# Patient Record
Sex: MALE | Race: WHITE | Employment: FULL TIME | URBAN - METROPOLITAN AREA
[De-identification: names, ages, dates, MRNs, and addresses within clinical notes are randomized per-mention and may not be internally consistent; named-entity substitution may affect disease eponyms.]

---

## 2017-12-28 ENCOUNTER — HOSPITAL ENCOUNTER (OUTPATIENT)
Age: 42
Discharge: HOME | End: 2017-12-28
Payer: COMMERCIAL

## 2017-12-28 LAB
EKG ATRIAL RATE: 69 BPM
EKG P AXIS: 52 DEGREES
EKG P-R INTERVAL: 174 MS
EKG Q-T INTERVAL: 392 MS
EKG QRS DURATION: 110 MS
EKG QTC CALCULATION (BAZETT): 420 MS
EKG R AXIS: 59 DEGREES
EKG T AXIS: 66 DEGREES
EKG VENTRICULAR RATE: 69 BPM

## 2017-12-28 PROCEDURE — 93005 ELECTROCARDIOGRAM TRACING: CPT

## 2019-04-16 ENCOUNTER — OFFICE VISIT (OUTPATIENT)
Dept: PODIATRY | Age: 44
End: 2019-04-16
Payer: COMMERCIAL

## 2019-04-16 VITALS
DIASTOLIC BLOOD PRESSURE: 91 MMHG | OXYGEN SATURATION: 95 % | SYSTOLIC BLOOD PRESSURE: 158 MMHG | HEART RATE: 72 BPM | HEIGHT: 74 IN | TEMPERATURE: 98.4 F | BODY MASS INDEX: 36.45 KG/M2 | WEIGHT: 284 LBS | RESPIRATION RATE: 16 BRPM

## 2019-04-16 DIAGNOSIS — M79.671 BILATERAL FOOT PAIN: ICD-10-CM

## 2019-04-16 DIAGNOSIS — L60.0 INGROWING NAIL: ICD-10-CM

## 2019-04-16 DIAGNOSIS — R26.2 TROUBLE WALKING: Primary | ICD-10-CM

## 2019-04-16 DIAGNOSIS — M79.672 BILATERAL FOOT PAIN: ICD-10-CM

## 2019-04-16 PROCEDURE — 11750 EXCISION NAIL&NAIL MATRIX: CPT | Performed by: PODIATRIST

## 2019-04-16 PROCEDURE — 99203 OFFICE O/P NEW LOW 30 MIN: CPT | Performed by: PODIATRIST

## 2019-04-16 NOTE — PROGRESS NOTES
[facility] PHYSICIANS  [facility] [address]  [address]  Dept: [phone]  Dept Fax: [phone]    NEW PATIENT PROGRESS NOTE  Date of patient's visit: 4/16/2019  Patient's Name:  Tracy Burnett YOB: 1975            Patient Care Team:  Aisha Grimaldo as PCP - General Laurie Grossman DPM as Physician (Podiatry)        Chief Complaint   Patient presents with    New Patient    Nail Problem         HPI:   Tracy Burnett is a 38 y.o. male who presents to the office today complaining of bilateral nail pain. Symptoms began 2 month(s) ago. Patient relates pain is Present. Pain is rated 3 out of 10 and is described as intermittent, mild. Treatments prior to today's visit include: none. Currently denies F/C/N/V. Pt's primary care physician is Aisha Grimaldo last seen 4/10/2019. No Known Allergies    No past medical history on file. Prior to Admission medications    Not on File       Past Surgical History:   Procedure Laterality Date    APPENDECTOMY      BACK SURGERY  2-2013    CHOLECYSTECTOMY      EXPLORATION OF UNDESCENDED TESTICLE      TONSILLECTOMY      VASECTOMY         No family history on file. Social History     Tobacco Use    Smoking status: Never Smoker    Smokeless tobacco: Current User     Types: Chew   Substance Use Topics    Alcohol use: Yes     Comment: 1/month       Review of Systems    Review of Systems:   History obtained from chart review and the patient  General ROS: negative for - chills, fatigue, fever, night sweats or weight gain  Constitutional: Negative for chills, diaphoresis, fatigue, fever and unexpected weight change. Musculoskeletal: Positive for arthralgias, gait problem and joint swelling. Neurological ROS: negative for - behavioral changes, confusion, headaches or seizures. Negative for weakness and numbness.    Dermatological ROS: negative for - mole changes, rash  Cardiovascular: Negative for leg swelling. Gastrointestinal: Negative for constipation, diarrhea, nausea and vomiting. Lower Extremity Physical Examination:   Vitals:   Vitals:    04/16/19 0813   BP: (!) 158/91   Pulse: 72   Resp: 16   Temp: 98.4 °F (36.9 °C)   SpO2: 95%     General: AAO x 3 in NAD. [facility] Dermatologic Exam:  Skin lesion/ulceration Absent . Skin No rashes or nodules noted. .       Musculoskeletal:     1st MPJ ROM decreased, Bilateral.  Muscle strength 5/5, Bilateral.  Pain present upon palpation of right and left great toe nail with thickening and incurvation. There is erythema to the nail border. Medial longitudinal arch, Bilateral WNL. Ankle ROM WNL,Bilateral.    Dorsally contracted digits absent digits 1-5 Bilateral.     Vascular: DP and PT pulses palpable 2/4, Bilateral.  CFT <3 seconds, Bilateral.  Hair growth present to the level of the digits, Bilateral.  Edema absent, Bilateral.  Varicosities absent, Bilateral. Erythema absent, Bilateral    Neurological: Sensation intact to light touch to level of digits, Bilateral.  Protective sensation intact 10/10 sites via 5.07/10g [place]-Gabriele Monofilament, Bilateral.  negative Tinel's, Bilateral.  negative Valleix sign, Bilateral.      Integument: Warm, dry, supple, Bilateral.  Open lesion absent, Bilateral.  Interdigital maceration absent to web spaces 1-4, Bilateral.    Fissures absent, Bilateral.     Asessment: Patient is a 38 y.o. male with:    Diagnosis Orders   1. Trouble walking  57934 - [place] REMOVAL OF NAIL BED   2. Bilateral foot pain  93190 - [place] REMOVAL OF NAIL BED   3. Ingrowing nail  36579 - [place] REMOVAL OF NAIL BED         Plan: Patient examined and evaluated. Current condition and treatment options discussed in detail. Discussed conservative and surgical options with the patient. Advised pt to his condition. Verbal consent obtained for chemical matrixectomy after all questions answered.   Local anesthesia obtained via Hallux block to the Bilateral hallux utilizing 5 cc of 1% Lidocaine plain. Next the Bilateral hallux was prepped with Betadine. A digital tourniquet was applied. A freer elevator was used to free the lateral nail border. An english anvil was used to remove the offending border in total.  A curette was used to ensure the offending border was free of any remaining nail. Next, three 30 second applications of 45% Phenol were applied to the offending nail border followed by an isopropyl alcohol flush. Triple antibiotic ointment was applied to the nail border followed by a semi-compressive dressing. The patient was instructed to leave this dressing clean/dry/intact until the following am at which point they will begin warm epsom salt soaks followed by application of antibiotic ointment and Band-Aid BID. Patient instructed to take Tylenol PRN pain. Post-operative chemical matrixectomy instruction sheet dispensed to patient. .  Verbal and written instructions given to patient. Contact office with any questions/problems/concerns. RTC in 2week(s).     4/16/2019    Electronically signed by Arabella Herron DPM on 4/16/2019 at 8:18 AM  4/16/2019

## 2019-04-30 ENCOUNTER — OFFICE VISIT (OUTPATIENT)
Dept: PODIATRY | Age: 44
End: 2019-04-30
Payer: COMMERCIAL

## 2019-04-30 VITALS — BODY MASS INDEX: 36.45 KG/M2 | WEIGHT: 284 LBS | RESPIRATION RATE: 16 BRPM | HEIGHT: 74 IN

## 2019-04-30 DIAGNOSIS — R26.2 TROUBLE WALKING: Primary | ICD-10-CM

## 2019-04-30 DIAGNOSIS — M79.672 BILATERAL FOOT PAIN: ICD-10-CM

## 2019-04-30 DIAGNOSIS — M79.671 BILATERAL FOOT PAIN: ICD-10-CM

## 2019-04-30 PROCEDURE — 99213 OFFICE O/P EST LOW 20 MIN: CPT | Performed by: PODIATRIST

## 2019-04-30 NOTE — PROGRESS NOTES
upon palpation of lateral borders of both great toes. Much improved pain. Medial longitudinal arch, Bilateral WNL. Ankle ROM WNL,Bilateral.    Dorsally contracted digits absent digits 1-5 Bilateral.     Vascular: DP and PT pulses palpable 2/4, Bilateral.  CFT <3 seconds, Bilateral.  Hair growth present to the level of the digits, Bilateral.  Edema absent, Bilateral.  Varicosities absent, Bilateral. Erythema absent, Bilateral    Neurological: Sensation intact to light touch to level of digits, Bilateral.  Protective sensation intact 10/10 sites via 5.07/10g [place]-Gabriele Monofilament, Bilateral.  negative Tinel's, Bilateral.  negative Valleix sign, Bilateral.      Integument: Warm, dry, supple, Bilateral.  Open lesion absent, Bilateral.  Interdigital maceration absent to web spaces 1-4, Bilateral.  Nails are normal in length, thickness and color 1-5 bilateral.  Fissures absent, Bilateral.       Asessment: Patient is a 38 y.o. male with:    Diagnosis Orders   1. Trouble walking     2. Bilateral foot pain         Plan: Patient examined and evaluated. Current condition and treatment options discussed in detail. Advised pt to keep soaking his feet daily but keeep bandaid off at night. .  Verbal and written instructions given to patient. Contact office with any questions/problems/concerns. No orders of the defined types were placed in this encounter. No orders of the defined types were placed in this encounter.        RTC in prn.    4/30/2019      Electronically signed by Leti Fernández DPM on 4/30/2019 at 9:47 AM  4/30/2019

## 2019-08-13 ENCOUNTER — OFFICE VISIT (OUTPATIENT)
Dept: PODIATRY | Age: 44
End: 2019-08-13
Payer: COMMERCIAL

## 2019-08-13 VITALS — BODY MASS INDEX: 35.29 KG/M2 | HEIGHT: 74 IN | RESPIRATION RATE: 16 BRPM | WEIGHT: 275 LBS

## 2019-08-13 DIAGNOSIS — M79.671 BILATERAL FOOT PAIN: ICD-10-CM

## 2019-08-13 DIAGNOSIS — M79.672 BILATERAL FOOT PAIN: ICD-10-CM

## 2019-08-13 DIAGNOSIS — R26.2 TROUBLE WALKING: Primary | ICD-10-CM

## 2019-08-13 DIAGNOSIS — L60.0 INGROWING NAIL: ICD-10-CM

## 2019-08-13 PROCEDURE — 11750 EXCISION NAIL&NAIL MATRIX: CPT | Performed by: PODIATRIST

## 2019-08-13 NOTE — PROGRESS NOTES
[facility] PHYSICIANS  [facility]  [address] [place] [address]  Dept: [phone]  Dept Fax: [phone]    RETURN PATIENT PROGRESS NOTE  Date of patient's visit: 8/13/2019  Patient's Name:  Mario Goldman YOB: 1975            Patient Care Team:  Anuel Delacruz as PCP - General  Nani Maddox DPM as Physician (Podiatry)       Mario Goldman 38 y.o. male that presents for follow-up of   Chief Complaint   Patient presents with    Nail Problem    Toe Pain     left great toe     Pt's primary care physician is Anuel Delacruz last seen 4/10/2019  Symptoms began 1 week(s) ago and are unchanged . Patient relates pain is Present. Pain is rated 4 out of 10 and is described as constant, moderate. Treatments prior to today's visit include: cutting them out hisself but it has been too painful. Currently denies F/C/N/V. No Known Allergies    No past medical history on file. Prior to Admission medications    Not on File       Review of Systems    Review of Systems:  History obtained from chart review and the patient  General ROS: negative for - chills, fatigue, fever, night sweats or weight gain  Constitutional: Negative for chills, diaphoresis, fatigue, fever and unexpected weight change. Musculoskeletal: Positive for arthralgias, gait problem and joint swelling. Neurological ROS: negative for - behavioral changes, confusion, headaches or seizures. Negative for weakness and numbness. Dermatological ROS: negative for - mole changes, rash  Cardiovascular: Negative for leg swelling. Gastrointestinal: Negative for constipation, diarrhea, nausea and vomiting. Lower Extremity Physical Examination:     Vitals:   Vitals:    08/13/19 1441   Resp: 16     General: AAO x 3 in NAD. Dermatologic Exam:  Skin lesion/ulceration Absent . Skin No rashes or nodules noted. .       Musculoskeletal:     1st MPJ ROM decreased, Bilateral.  Muscle strength

## 2020-02-05 ENCOUNTER — HOSPITAL ENCOUNTER (OUTPATIENT)
Dept: GENERAL RADIOLOGY | Age: 45
Discharge: HOME | End: 2020-02-05

## 2020-02-05 ENCOUNTER — HOSPITAL ENCOUNTER (OUTPATIENT)
Age: 45
Discharge: HOME | End: 2020-02-05

## 2020-02-05 DIAGNOSIS — Z00.00 PHYSICAL EXAM, ANNUAL: Primary | ICD-10-CM

## 2020-02-05 LAB
ALBUMIN SERPL-MCNC: 4.7 G/DL (ref 3.5–5.1)
ALP BLD-CCNC: 56 U/L (ref 38–126)
ALT SERPL-CCNC: 21 U/L (ref 11–66)
ANION GAP SERPL CALCULATED.3IONS-SCNC: 15 MEQ/L (ref 8–16)
AST SERPL-CCNC: 33 U/L (ref 5–40)
BACTERIA: ABNORMAL
BASOPHILS # BLD: 0.5 %
BASOPHILS ABSOLUTE: 0 THOU/MM3 (ref 0–0.1)
BILIRUB SERPL-MCNC: 0.8 MG/DL (ref 0.3–1.2)
BILIRUBIN URINE: NEGATIVE
BLOOD, URINE: ABNORMAL
BUN BLDV-MCNC: 10 MG/DL (ref 7–22)
CALCIUM SERPL-MCNC: 9.5 MG/DL (ref 8.5–10.5)
CASTS: ABNORMAL /LPF
CASTS: ABNORMAL /LPF
CHARACTER, URINE: CLEAR
CHLORIDE BLD-SCNC: 103 MEQ/L (ref 98–111)
CHOLESTEROL, TOTAL: 164 MG/DL (ref 100–199)
CO2: 23 MEQ/L (ref 23–33)
COLOR: YELLOW
CREAT SERPL-MCNC: 0.9 MG/DL (ref 0.4–1.2)
CRYSTALS: ABNORMAL
EKG ATRIAL RATE: 50 BPM
EKG P AXIS: 24 DEGREES
EKG P-R INTERVAL: 172 MS
EKG Q-T INTERVAL: 440 MS
EKG QRS DURATION: 114 MS
EKG QTC CALCULATION (BAZETT): 401 MS
EKG R AXIS: 21 DEGREES
EKG T AXIS: 49 DEGREES
EKG VENTRICULAR RATE: 50 BPM
EOSINOPHIL # BLD: 2.7 %
EOSINOPHILS ABSOLUTE: 0.2 THOU/MM3 (ref 0–0.4)
EPITHELIAL CELLS, UA: ABNORMAL /HPF
ERYTHROCYTE [DISTWIDTH] IN BLOOD BY AUTOMATED COUNT: 15.1 % (ref 11.5–14.5)
ERYTHROCYTE [DISTWIDTH] IN BLOOD BY AUTOMATED COUNT: 47.7 FL (ref 35–45)
GFR SERPL CREATININE-BSD FRML MDRD: > 90 ML/MIN/1.73M2
GLUCOSE BLD-MCNC: 76 MG/DL (ref 70–108)
GLUCOSE, URINE: NEGATIVE MG/DL
HCT VFR BLD CALC: 50.2 % (ref 42–52)
HDLC SERPL-MCNC: 40 MG/DL
HEMOGLOBIN: 16 GM/DL (ref 14–18)
IMMATURE GRANS (ABS): 0.02 THOU/MM3 (ref 0–0.07)
IMMATURE GRANULOCYTES: 0.3 %
KETONES, URINE: NEGATIVE
LDL CHOLESTEROL CALCULATED: 109 MG/DL
LEUKOCYTE ESTERASE, URINE: NEGATIVE
LYMPHOCYTES # BLD: 40.2 %
LYMPHOCYTES ABSOLUTE: 2.4 THOU/MM3 (ref 1–4.8)
MCH RBC QN AUTO: 28 PG (ref 26–33)
MCHC RBC AUTO-ENTMCNC: 31.9 GM/DL (ref 32.2–35.5)
MCV RBC AUTO: 87.8 FL (ref 80–94)
MISCELLANEOUS LAB TEST RESULT: ABNORMAL
MONOCYTES # BLD: 12.1 %
MONOCYTES ABSOLUTE: 0.7 THOU/MM3 (ref 0.4–1.3)
NITRITE, URINE: NEGATIVE
NUCLEATED RED BLOOD CELLS: 0 /100 WBC
PH UA: 6 (ref 5–9)
PLATELET # BLD: 379 THOU/MM3 (ref 130–400)
PMV BLD AUTO: 9.3 FL (ref 9.4–12.4)
POTASSIUM SERPL-SCNC: 4.1 MEQ/L (ref 3.5–5.2)
PROSTATE SPECIFIC ANTIGEN: 0.57 NG/ML (ref 0–1)
PROTEIN UA: NEGATIVE MG/DL
RBC # BLD: 5.72 MILL/MM3 (ref 4.7–6.1)
RBC URINE: ABNORMAL /HPF
RENAL EPITHELIAL, UA: ABNORMAL
SEG NEUTROPHILS: 44.2 %
SEGMENTED NEUTROPHILS ABSOLUTE COUNT: 2.7 THOU/MM3 (ref 1.8–7.7)
SODIUM BLD-SCNC: 141 MEQ/L (ref 135–145)
SPECIFIC GRAVITY UA: 1.02 (ref 1–1.03)
TOTAL PROTEIN: 7.6 G/DL (ref 6.1–8)
TRIGL SERPL-MCNC: 75 MG/DL (ref 0–199)
UROBILINOGEN, URINE: 0.2 EU/DL (ref 0–1)
WBC # BLD: 6 THOU/MM3 (ref 4.8–10.8)
WBC UA: ABNORMAL /HPF
YEAST: ABNORMAL

## 2020-07-17 ENCOUNTER — OFFICE VISIT (OUTPATIENT)
Dept: FAMILY MEDICINE CLINIC | Age: 45
End: 2020-07-17
Payer: COMMERCIAL

## 2020-07-17 VITALS
RESPIRATION RATE: 16 BRPM | SYSTOLIC BLOOD PRESSURE: 118 MMHG | WEIGHT: 280 LBS | HEIGHT: 74 IN | TEMPERATURE: 98.4 F | DIASTOLIC BLOOD PRESSURE: 80 MMHG | BODY MASS INDEX: 35.94 KG/M2 | OXYGEN SATURATION: 96 % | HEART RATE: 62 BPM

## 2020-07-17 PROCEDURE — 99202 OFFICE O/P NEW SF 15 MIN: CPT | Performed by: NURSE PRACTITIONER

## 2020-07-17 PROCEDURE — 96372 THER/PROPH/DIAG INJ SC/IM: CPT | Performed by: NURSE PRACTITIONER

## 2020-07-17 RX ORDER — METHYLPREDNISOLONE SODIUM SUCCINATE 125 MG/2ML
125 INJECTION, POWDER, LYOPHILIZED, FOR SOLUTION INTRAMUSCULAR; INTRAVENOUS ONCE
Status: COMPLETED | OUTPATIENT
Start: 2020-07-17 | End: 2020-07-17

## 2020-07-17 RX ORDER — CEPHALEXIN 500 MG/1
500 CAPSULE ORAL 3 TIMES DAILY
Qty: 21 CAPSULE | Refills: 0 | Status: SHIPPED | OUTPATIENT
Start: 2020-07-17 | End: 2020-07-24

## 2020-07-17 RX ORDER — PREDNISONE 20 MG/1
20 TABLET ORAL DAILY
Qty: 15 TABLET | Refills: 0 | Status: SHIPPED | OUTPATIENT
Start: 2020-07-18

## 2020-07-17 RX ADMIN — METHYLPREDNISOLONE SODIUM SUCCINATE 125 MG: 125 INJECTION, POWDER, LYOPHILIZED, FOR SOLUTION INTRAMUSCULAR; INTRAVENOUS at 10:08

## 2020-07-17 ASSESSMENT — PATIENT HEALTH QUESTIONNAIRE - PHQ9
SUM OF ALL RESPONSES TO PHQ QUESTIONS 1-9: 0
1. LITTLE INTEREST OR PLEASURE IN DOING THINGS: 0
SUM OF ALL RESPONSES TO PHQ QUESTIONS 1-9: 0
2. FEELING DOWN, DEPRESSED OR HOPELESS: 0
SUM OF ALL RESPONSES TO PHQ9 QUESTIONS 1 & 2: 0

## 2020-07-17 ASSESSMENT — ENCOUNTER SYMPTOMS
BACK PAIN: 0
DIARRHEA: 0
COUGH: 0
SINUS PAIN: 0
ABDOMINAL PAIN: 0
EYE PAIN: 0
NAUSEA: 0
SORE THROAT: 0
VOMITING: 0
SHORTNESS OF BREATH: 0

## 2020-07-17 NOTE — PATIENT INSTRUCTIONS
yourself epinephrine and are feeling better. Symptoms can come back. When should you call for help? [id number] anytime you think you may need emergency care. For example, call if:  · You have symptoms of a severe allergic reaction. These may include:  ? Sudden raised, red areas (hives) all over your body. ? Swelling of the throat, mouth, lips, or tongue. ? Trouble breathing. ? Passing out (losing consciousness). Or you may feel very lightheaded or suddenly feel weak, confused, or restless. Call your doctor now or seek immediate medical care if:  · You have symptoms of an allergic reaction not right at the sting or bite, such as:  ? A rash or small area of hives (raised, red areas on the skin). ? Itching. ? Swelling. ? Belly pain, nausea, or vomiting. · You have a lot of swelling around the site (such as your entire arm or leg is swollen). · You have signs of infection, such as:  ? Increased pain, swelling, redness, or warmth around the sting. ? Red streaks leading from the area. ? Pus draining from the sting. ? A fever. Watch closely for changes in your health, and be sure to contact your doctor if:  · You do not get better as expected. Where can you learn more? Go to https://[employer]. org and sign in to your [employer] account. Enter P390 in the [facility] box to learn more about \"Insect Stings and Bites: Care Instructions. \"     If you do not have an account, please click on the \"Sign Up Now\" link. Current as of: June 26, 2019               Content Version: 12.5  © 9797-7189 Healthwise, Incorporated. Care instructions adapted under license by [facility] ([facility]). If you have questions about a medical condition or this instruction, always ask your healthcare professional. [address] any warranty or liability for your use of this information.

## 2020-07-17 NOTE — PROGRESS NOTES
[address] [facility]  [address] [facility] [address] [address]  Dept: [phone]  Dept Fax: [phone]    Dorota Betts is a 54 y.o. male who presents to the urgent care today for his medicalconditions/complaints as noted below. Dorota Betts is c/o of Allergic Reaction (stung by a bee on right ring finger 2 days ago started having swelling and it has been worsening since  traveling up the forearm last dose of benadryl at 5am this morning )      HPI:         24-year-old male patient presents with complaint of bee sting. Patient reportedly reports stung by a bee to his right ring finger 2 days ago. Reports that he had mild localized swelling which developed yesterday and worsened throughout the day. Has tried ice and Benadryl without significant resolution. Denies any tongue or lip swelling. No difficulty swallowing or breathing. Patient reports he is a  denies significant reactions in the past but has been stung plenty of times previously. Right hand is significantly swollen, painful with range of motion, there is faint erythema and warmth. No past medical history on file. Current Outpatient Medications   Medication Sig Dispense Refill    [START ON 7/18/2020] predniSONE (DELTASONE) 20 MG tablet Take 1 tablet by mouth daily Take 3 tabs po x 2 days, 2 tabs po x 3 days, 1 tab po x 2 days, half tab po x 2 days 15 tablet 0    cephALEXin (KEFLEX) 500 MG capsule Take 1 capsule by mouth 3 times daily for 7 days 21 capsule 0     No current facility-administered medications for this visit. No Known Allergies    Subjective:      Review of Systems   Constitutional: Negative for chills and fatigue. HENT: Negative for congestion, ear pain, sinus pain and sore throat. Eyes: Negative for pain and visual disturbance. Respiratory: Negative for cough and shortness of breath.     Cardiovascular: Negative for chest pain and palpitations. Gastrointestinal: Negative for abdominal pain, diarrhea, nausea and vomiting. Genitourinary: Negative for penile pain and testicular pain. Musculoskeletal: Positive for joint swelling (rt hand). Negative for back pain and neck pain. Skin: Negative for rash. Neurological: Negative for dizziness and light-headedness. Hematological: Does not bruise/bleed easily. All other systems reviewed and are negative. Objective:     Physical Exam  Vitals signs and nursing note reviewed. Constitutional:       Appearance: Normal appearance. Cardiovascular:      Rate and Rhythm: Normal rate. Pulmonary:      Effort: Pulmonary effort is normal.      Breath sounds: Normal breath sounds. Musculoskeletal:      Right hand: He exhibits swelling. Hands:    Skin:     General: Skin is warm and dry. Neurological:      General: No focal deficit present. Mental Status: He is alert and oriented to person, place, and time. /80 (Site: Left Upper Arm, Position: Sitting, Cuff Size: Large Adult)   Pulse 62   Temp 98.4 °F (36.9 °C) (Oral)   Resp 16   Ht 6' 2\" (1.88 m)   Wt 280 lb (127 kg)   SpO2 96%   BMI 35.95 kg/m²   Lab Review   No visits with results within 2 Month(s) from this visit.    Latest known visit with results is:   Hospital Outpatient Visit on 02/05/2020   Component Date Value    Cholesterol, Total 02/05/2020 164     Triglycerides 02/05/2020 75     HDL 02/05/2020 40     LDL Calculated 02/05/2020 109     Glucose 02/05/2020 76     CREATININE 02/05/2020 0.9     BUN 02/05/2020 10     Sodium 02/05/2020 141     Potassium 02/05/2020 4.1     Chloride 02/05/2020 103     CO2 02/05/2020 23     Calcium 02/05/2020 9.5     AST 02/05/2020 33     Alkaline Phosphatase 02/05/2020 56     Total Protein 02/05/2020 7.6     Alb 02/05/2020 4.7     Total Bilirubin 02/05/2020 0.8     ALT 02/05/2020 21     WBC 02/05/2020 6.0     RBC 02/05/2020 5.72     Hemoglobin 02/05/2020 16.0     Hematocrit 02/05/2020 50.2     MCV 02/05/2020 87.8     MCH 02/05/2020 28.0     MCHC 02/05/2020 31.9*    RDW-CV 02/05/2020 15.1*    RDW-SD 02/05/2020 47.7*    Platelets 45/08/5976 379     MPV 02/05/2020 9.3*    Seg Neutrophils 02/05/2020 44.2     Lymphocytes 02/05/2020 40.2     Monocytes 02/05/2020 12.1     Eosinophils 02/05/2020 2.7     Basophils 02/05/2020 0.5     Immature Granulocytes 02/05/2020 0.3     Segs Absolute 02/05/2020 2.7     Lymphocytes Absolute 02/05/2020 2.4     Monocytes Absolute 02/05/2020 0.7     Eosinophils Absolute 02/05/2020 0.2     Basophils Absolute 02/05/2020 0.0     Immature Grans (Abs) 02/05/2020 0.02     nRBC 02/05/2020 0     PSA 02/05/2020 0.57     Glucose, Urine 02/05/2020 NEGATIVE     Bilirubin Urine 02/05/2020 NEGATIVE     Ketones, Urine 02/05/2020 NEGATIVE     Specific [place], UA 02/05/2020 1.025     Blood, Urine 02/05/2020 TRACE*    pH, UA 02/05/2020 6.0     Protein, UA 02/05/2020 NEGATIVE     Urobilinogen, Urine 02/05/2020 0.2     Nitrite, Urine 02/05/2020 NEGATIVE     Leukocyte Esterase, Urine 02/05/2020 NEGATIVE     Color, UA 02/05/2020 YELLOW     Character, Urine 02/05/2020 CLEAR     RBC, UA 02/05/2020 3-5     WBC, UA 02/05/2020 10-15     Epithelial Cells, UA 02/05/2020 3-5     Bacteria, UA 02/05/2020 NONE SEEN     Casts 02/05/2020 4-8 HYALINE     Crystals 02/05/2020 NONE SEEN     Renal Epithelial, UA 02/05/2020 NONE SEEN     Yeast, UA 02/05/2020 NONE SEEN     Casts 02/05/2020 NONE SEEN     Miscellaneous Lab Test R* 02/05/2020 NONE SEEN     Anion Gap 02/05/2020 15.0     Est, Glom Filt Rate 02/05/2020 >90        Assessment:       Diagnosis Orders   1. Allergic reaction to bee sting  methylPREDNISolone sodium (SOLU-MEDROL) injection 125 mg    predniSONE (DELTASONE) 20 MG tablet    cephALEXin (KEFLEX) 500 MG capsule       Plan:      Return if symptoms worsen or fail to improve.     Orders Placed This Encounter   Medications    methylPREDNISolone sodium (SOLU-MEDROL) injection 125 mg    predniSONE (DELTASONE) 20 MG tablet     Sig: Take 1 tablet by mouth daily Take 3 tabs po x 2 days, 2 tabs po x 3 days, 1 tab po x 2 days, half tab po x 2 days     Dispense:  15 tablet     Refill:  0    cephALEXin (KEFLEX) 500 MG capsule     Sig: Take 1 capsule by mouth 3 times daily for 7 days     Dispense:  21 capsule     Refill:  0       Administrations This Visit     methylPREDNISolone sodium (SOLU-MEDROL) injection 125 mg     Admin Date  07/17/2020 Action  Given Dose  125 mg Route  Intramuscular Administered By  Mateusz Donaldson CMA              Discussed oral steroid dose/duration to start tomorrow. Discussed Keflex dose/duration. Return with any worsening, follow-up PCP     Patient given educational materials - see patient instructions. Discussed use, benefit, and side effects of prescribed medications. All patientquestions answered. Pt voiced understanding. This note was transcribed using dictation with Dragon services. Efforts were made to correct any errors but some words may be misinterpreted.      Electronically signed by AFIA Polk CNP on 7/17/2020at 10:12 AM

## 2021-06-10 ENCOUNTER — HOSPITAL ENCOUNTER (OUTPATIENT)
Age: 46
Discharge: HOME | End: 2021-06-12
Payer: COMMERCIAL

## 2021-06-10 ENCOUNTER — HOSPITAL ENCOUNTER (OUTPATIENT)
Dept: GENERAL RADIOLOGY | Age: 46
Discharge: HOME | End: 2021-06-12
Payer: COMMERCIAL

## 2021-06-10 DIAGNOSIS — M25.562 ARTHRALGIA OF LEFT KNEE: ICD-10-CM

## 2021-06-10 PROCEDURE — 73562 X-RAY EXAM OF KNEE 3: CPT

## 2021-09-04 ENCOUNTER — OFFICE VISIT (OUTPATIENT)
Dept: PRIMARY CARE CLINIC | Age: 46
End: 2021-09-04
Payer: COMMERCIAL

## 2021-09-04 VITALS
BODY MASS INDEX: 36.32 KG/M2 | HEART RATE: 85 BPM | HEIGHT: 74 IN | WEIGHT: 283 LBS | TEMPERATURE: 98.2 F | OXYGEN SATURATION: 95 %

## 2021-09-04 DIAGNOSIS — T63.441A ALLERGIC REACTION TO BEE STING: Primary | ICD-10-CM

## 2021-09-04 PROCEDURE — 99213 OFFICE O/P EST LOW 20 MIN: CPT | Performed by: NURSE PRACTITIONER

## 2021-09-04 PROCEDURE — 96372 THER/PROPH/DIAG INJ SC/IM: CPT | Performed by: NURSE PRACTITIONER

## 2021-09-04 RX ORDER — DIPHENHYDRAMINE HYDROCHLORIDE 50 MG/ML
50 INJECTION INTRAMUSCULAR; INTRAVENOUS ONCE
Status: COMPLETED | OUTPATIENT
Start: 2021-09-04 | End: 2021-09-04

## 2021-09-04 RX ORDER — PREDNISONE 20 MG/1
20 TABLET ORAL DAILY
Qty: 15 TABLET | Refills: 0 | Status: SHIPPED | OUTPATIENT
Start: 2021-09-05

## 2021-09-04 RX ORDER — CEPHALEXIN 500 MG/1
500 CAPSULE ORAL 3 TIMES DAILY
Qty: 21 CAPSULE | Refills: 0 | Status: SHIPPED | OUTPATIENT
Start: 2021-09-04

## 2021-09-04 RX ORDER — METHYLPREDNISOLONE SODIUM SUCCINATE 125 MG/2ML
125 INJECTION, POWDER, LYOPHILIZED, FOR SOLUTION INTRAMUSCULAR; INTRAVENOUS ONCE
Status: COMPLETED | OUTPATIENT
Start: 2021-09-04 | End: 2021-09-04

## 2021-09-04 RX ADMIN — DIPHENHYDRAMINE HYDROCHLORIDE 50 MG: 50 INJECTION INTRAMUSCULAR; INTRAVENOUS at 13:32

## 2021-09-04 RX ADMIN — METHYLPREDNISOLONE SODIUM SUCCINATE 125 MG: 125 INJECTION, POWDER, LYOPHILIZED, FOR SOLUTION INTRAMUSCULAR; INTRAVENOUS at 13:32

## 2021-09-04 ASSESSMENT — ENCOUNTER SYMPTOMS
DIARRHEA: 0
COUGH: 0
SINUS PAIN: 0
EYE PAIN: 0
NAUSEA: 0
SHORTNESS OF BREATH: 0
ABDOMINAL PAIN: 0
BACK PAIN: 0
VOMITING: 0
SORE THROAT: 0

## 2021-09-04 NOTE — PROGRESS NOTES
[facility] [address] [facility] [place] [facility]  [address] [address]  Dept: [phone]  Dept Fax: [phone]    Regi Cisneros is a 51 y.o. male who presents today for his medicalconditions/complaints as noted below. Regi Cisneros is c/o of Insect Bite      HPI:       58-year-old male patient presents with complaints of bee sting. Patient reports that 1 hour prior to arrival was stung by approximately 12 degrees to the bilateral lower extremities. Reports he was stung through his jeans. Patient has significant redness, itching, swelling to the bilateral lower extremities. Denies fevers or chills. Denies tongue or lip swelling. Denies difficulty swallowing or breathing. History reviewed. No pertinent past medical history. Current Outpatient Medications   Medication Sig Dispense Refill    [START ON 9/5/2021] predniSONE (DELTASONE) 20 MG tablet Take 1 tablet by mouth daily Take 3 tabs po x 2 days, 2 tabs po x 3 days, 1 tab po x 2 days, half tab po x 2 days 15 tablet 0    cephALEXin (KEFLEX) 500 MG capsule Take 1 capsule by mouth 3 times daily 21 capsule 0    predniSONE (DELTASONE) 20 MG tablet Take 1 tablet by mouth daily Take 3 tabs po x 2 days, 2 tabs po x 3 days, 1 tab po x 2 days, half tab po x 2 days 15 tablet 0     No current facility-administered medications for this visit. No Known Allergies    Subjective:      Review of Systems   Constitutional: Negative for chills and fatigue. HENT: Negative for congestion, ear pain, sinus pain and sore throat. Eyes: Negative for pain and visual disturbance. Respiratory: Negative for cough and shortness of breath. Cardiovascular: Positive for leg swelling. Negative for chest pain and palpitations. Gastrointestinal: Negative for abdominal pain, diarrhea, nausea and vomiting. Genitourinary: Negative for penile pain and testicular pain.    Musculoskeletal: Negative for back pain, joint swelling and neck pain. Skin: Positive for rash. Neurological: Negative for dizziness and light-headedness. Hematological: Does not bruise/bleed easily. All other systems reviewed and are negative.      :Objective     Physical Exam  Vitals and nursing note reviewed. Constitutional:       General: He is not in acute distress. Appearance: Normal appearance. He is not toxic-appearing. HENT:      Mouth/Throat:      Mouth: Mucous membranes are moist.   Cardiovascular:      Rate and Rhythm: Normal rate. Pulmonary:      Effort: Pulmonary effort is normal. No respiratory distress. Breath sounds: Normal breath sounds. Musculoskeletal:      Comments: Extensive swelling, redness, hives to bilat lower legs, multiple sting locations. Skin:     General: Skin is warm and dry. Findings: Rash present. Neurological:      General: No focal deficit present. Mental Status: He is alert and oriented to person, place, and time. Pulse 85   Temp 98.2 °F (36.8 °C)   Ht 6' 2\" (1.88 m)   Wt 283 lb (128.4 kg)   SpO2 95%   BMI 36.34 kg/m²     Lab Review   No visits with results within 6 Month(s) from this visit.    Latest known visit with results is:   Hospital Outpatient Visit on 02/05/2020   Component Date Value    Cholesterol, Total 02/05/2020 164     Triglycerides 02/05/2020 75     HDL 02/05/2020 40     LDL Calculated 02/05/2020 109     Glucose 02/05/2020 76     CREATININE 02/05/2020 0.9     BUN 02/05/2020 10     Sodium 02/05/2020 141     Potassium 02/05/2020 4.1     Chloride 02/05/2020 103     CO2 02/05/2020 23     Calcium 02/05/2020 9.5     AST 02/05/2020 33     Alkaline Phosphatase 02/05/2020 56     Total Protein 02/05/2020 7.6     Albumin 02/05/2020 4.7     Total Bilirubin 02/05/2020 0.8     ALT 02/05/2020 21     WBC 02/05/2020 6.0     RBC 02/05/2020 5.72     Hemoglobin 02/05/2020 16.0     Hematocrit 02/05/2020 50.2     MCV 02/05/2020 87.8     MCH 02/05/2020 28.0     MCHC but some words may be misinterpreted.     Electronically signed by AFIA Melara CNP on 9/4/2021at 1:33 PM

## 2021-09-04 NOTE — PATIENT INSTRUCTIONS
Patient Education        Insect Stings and Bites: Care Instructions  Your Care Instructions  Stings and bites from bees, wasps, ants, and other insects often cause pain, swelling, redness, and itching. In some people, especially children, the redness and swelling may be worse. It may extend several inches beyond the affected area. But in most cases, stings and bites don't cause reactions all over the body. If you have had a reaction to an insect sting or bite, you are at risk for a reaction if you get stung or bitten again. Follow-up care is a key part of your treatment and safety. Be sure to make and go to all appointments, and call your doctor if you are having problems. It's also a good idea to know your test results and keep a list of the medicines you take. How can you care for yourself at home? · Do not scratch or rub the skin where the sting or bite occurred. · Put a cold pack or ice cube on the area. Put a thin cloth between the ice and your skin. For some people, a paste of baking soda mixed with a little water helps relieve pain and decrease the reaction. · Take an over-the-counter antihistamine, such as diphenhydramine (Benadryl) or loratadine (Claritin), to relieve swelling, redness, and itching. Calamine lotion or hydrocortisone cream may also help. Do not give antihistamines to your child unless you have checked with the doctor first.  · Be safe with medicines. If your doctor prescribed medicine for your allergy, take it exactly as prescribed. Call your doctor if you think you are having a problem with your medicine. You will get more details on the specific medicines your doctor prescribes. · Your doctor may prescribe a shot of epinephrine to carry with you in case you have a severe reaction. Learn how and when to give yourself the shot, and keep it with you at all times. Make sure it has not . · Go to the emergency room anytime you have a severe reaction.  Go even if you have given yourself epinephrine and are feeling better. Symptoms can come back. When should you call for help? Call 911 anytime you think you may need emergency care. For example, call if:    · You have symptoms of a severe allergic reaction. These may include:  ? Sudden raised, red areas (hives) all over your body. ? Swelling of the throat, mouth, lips, or tongue. ? Trouble breathing. ? Passing out (losing consciousness). Or you may feel very lightheaded or suddenly feel weak, confused, or restless. Call your doctor now or seek immediate medical care if:    · You have symptoms of an allergic reaction not right at the sting or bite, such as:  ? A rash or small area of hives (raised, red areas on the skin). ? Itching. ? Swelling. ? Belly pain, nausea, or vomiting.     · You have a lot of swelling around the site (such as your entire arm or leg is swollen).     · You have signs of infection, such as:  ? Increased pain, swelling, redness, or warmth around the sting. ? Red streaks leading from the area. ? Pus draining from the sting. ? A fever. Watch closely for changes in your health, and be sure to contact your doctor if:    · You do not get better as expected. Where can you learn more? Go to https://[employer]. org and sign in to your [employer] account. Enter P390 in the [facility] box to learn more about \"Insect Stings and Bites: Care Instructions. \"     If you do not have an account, please click on the \"Sign Up Now\" link. Current as of: October 19, 2020               Content Version: 12.9  © 8227-3530 [employer]. Care instructions adapted under license by [facility] ([facility]). If you have questions about a medical condition or this instruction, always ask your healthcare professional. [address] any warranty or liability for your use of this information.

## 2021-12-10 DIAGNOSIS — U07.1 COVID: ICD-10-CM

## 2021-12-10 RX ORDER — SODIUM CHLORIDE 9 MG/ML
INJECTION, SOLUTION INTRAVENOUS CONTINUOUS
OUTPATIENT
Start: 2021-12-13

## 2021-12-13 ENCOUNTER — HOSPITAL ENCOUNTER (OUTPATIENT)
Dept: INFUSION THERAPY | Age: 46
Discharge: HOME | End: 2021-12-13
Attending: INTERNAL MEDICINE
Payer: COMMERCIAL

## 2021-12-13 VITALS
RESPIRATION RATE: 16 BRPM | DIASTOLIC BLOOD PRESSURE: 90 MMHG | HEART RATE: 71 BPM | SYSTOLIC BLOOD PRESSURE: 130 MMHG | OXYGEN SATURATION: 93 %

## 2021-12-13 DIAGNOSIS — U07.1 COVID: ICD-10-CM

## 2021-12-13 DIAGNOSIS — U07.1 COVID-19: Primary | ICD-10-CM

## 2021-12-13 PROCEDURE — 6360000002 HC RX W HCPCS: Performed by: FAMILY MEDICINE

## 2021-12-13 PROCEDURE — 2500000003 HC RX 250 WO HCPCS: Performed by: FAMILY MEDICINE

## 2021-12-13 PROCEDURE — 2580000003 HC RX 258: Performed by: FAMILY MEDICINE

## 2021-12-13 RX ORDER — SODIUM CHLORIDE 9 MG/ML
INJECTION, SOLUTION INTRAVENOUS CONTINUOUS
OUTPATIENT
Start: 2021-12-13

## 2021-12-13 RX ADMIN — IMDEVIMAB: 1332 INJECTION, SOLUTION, CONCENTRATE INTRAVENOUS at 11:50

## 2021-12-13 NOTE — FLOWSHEET NOTE
Pt monitored for 1 hour post infusion without s/s of reaction. Pt given discharge instructions, information sheet on antibody infusion and portable pulse ox with instructions on use an parameters to use. Pt verbalized understanding and ambulatory out of department.

## 2022-05-16 ENCOUNTER — HOSPITAL ENCOUNTER (OUTPATIENT)
Age: 47
Discharge: HOME | End: 2022-05-16
Payer: COMMERCIAL

## 2022-05-16 LAB
ALT SERPL-CCNC: 25 U/L (ref 5–41)
ANION GAP SERPL CALCULATED.3IONS-SCNC: 10 MMOL/L (ref 9–17)
BUN BLDV-MCNC: 12 MG/DL (ref 6–20)
CALCIUM SERPL-MCNC: 9.2 MG/DL (ref 8.6–10.4)
CHLORIDE BLD-SCNC: 105 MMOL/L (ref 98–107)
CHOLESTEROL/HDL RATIO: 4.1
CHOLESTEROL: 169 MG/DL
CO2: 24 MMOL/L (ref 20–31)
CREAT SERPL-MCNC: 0.92 MG/DL (ref 0.7–1.2)
GFR AFRICAN AMERICAN: >60 ML/MIN
GFR NON-AFRICAN AMERICAN: >60 ML/MIN
GFR SERPL CREATININE-BSD FRML MDRD: ABNORMAL ML/MIN/{1.73_M2}
GLUCOSE BLD-MCNC: 107 MG/DL (ref 70–99)
HDLC SERPL-MCNC: 41 MG/DL
LDL CHOLESTEROL: 99 MG/DL (ref 0–130)
POTASSIUM SERPL-SCNC: 4.2 MMOL/L (ref 3.7–5.3)
PROSTATE SPECIFIC ANTIGEN: 0.44 NG/ML
SEX HORMONE BINDING GLOBULIN: 27 NMOL/L (ref 11–80)
SODIUM BLD-SCNC: 139 MMOL/L (ref 135–144)
TESTOSTERONE FREE-NONMALE: 44.4 PG/ML (ref 47–244)
TESTOSTERONE TOTAL: 208 NG/DL (ref 220–1000)
THYROXINE, FREE: 0.9 NG/DL (ref 0.93–1.7)
TRIGL SERPL-MCNC: 147 MG/DL
TSH SERPL DL<=0.05 MIU/L-ACNC: 3.46 UIU/ML (ref 0.3–5)
VITAMIN D 25-HYDROXY: 21.4 NG/ML

## 2022-05-16 PROCEDURE — 80048 BASIC METABOLIC PNL TOTAL CA: CPT

## 2022-05-16 PROCEDURE — 84439 ASSAY OF FREE THYROXINE: CPT

## 2022-05-16 PROCEDURE — 80061 LIPID PANEL: CPT

## 2022-05-16 PROCEDURE — 82306 VITAMIN D 25 HYDROXY: CPT

## 2022-05-16 PROCEDURE — 84443 ASSAY THYROID STIM HORMONE: CPT

## 2022-05-16 PROCEDURE — G0103 PSA SCREENING: HCPCS

## 2022-05-16 PROCEDURE — 36415 COLL VENOUS BLD VENIPUNCTURE: CPT

## 2022-05-16 PROCEDURE — 84460 ALANINE AMINO (ALT) (SGPT): CPT

## 2022-05-16 PROCEDURE — 84270 ASSAY OF SEX HORMONE GLOBUL: CPT

## 2022-05-16 PROCEDURE — 84403 ASSAY OF TOTAL TESTOSTERONE: CPT

## 2022-06-15 ENCOUNTER — HOSPITAL ENCOUNTER (OUTPATIENT)
Age: 47
Discharge: HOME | End: 2022-06-15
Payer: COMMERCIAL

## 2022-06-15 LAB
TESTOSTERONE TOTAL: 310 NG/DL (ref 220–1000)
THYROXINE, FREE: 0.89 NG/DL (ref 0.93–1.7)
TSH SERPL DL<=0.05 MIU/L-ACNC: 1.82 UIU/ML (ref 0.3–5)

## 2022-06-15 PROCEDURE — 36415 COLL VENOUS BLD VENIPUNCTURE: CPT

## 2022-06-15 PROCEDURE — 84403 ASSAY OF TOTAL TESTOSTERONE: CPT

## 2022-06-15 PROCEDURE — 84439 ASSAY OF FREE THYROXINE: CPT

## 2022-06-15 PROCEDURE — 84443 ASSAY THYROID STIM HORMONE: CPT

## 2024-01-23 ENCOUNTER — OFFICE VISIT (OUTPATIENT)
Dept: PODIATRY | Age: 49
End: 2024-01-23
Payer: COMMERCIAL

## 2024-01-23 VITALS — WEIGHT: 283 LBS | BODY MASS INDEX: 36.32 KG/M2 | HEIGHT: 74 IN

## 2024-01-23 DIAGNOSIS — M79.674 PAIN IN TOE OF RIGHT FOOT: ICD-10-CM

## 2024-01-23 DIAGNOSIS — L60.0 ONYCHOCRYPTOSIS: ICD-10-CM

## 2024-01-23 DIAGNOSIS — L03.031 PARONYCHIA OF GREAT TOE OF RIGHT FOOT: Primary | ICD-10-CM

## 2024-01-23 PROCEDURE — 11750 EXCISION NAIL&NAIL MATRIX: CPT | Performed by: PODIATRIST

## 2024-01-23 PROCEDURE — 99203 OFFICE O/P NEW LOW 30 MIN: CPT | Performed by: PODIATRIST

## 2024-01-23 RX ORDER — CETIRIZINE HYDROCHLORIDE 10 MG/1
10 TABLET ORAL DAILY
COMMUNITY
Start: 2023-06-26 | End: 2024-06-25

## 2024-01-23 ASSESSMENT — ENCOUNTER SYMPTOMS
COLOR CHANGE: 0
NAUSEA: 0
SHORTNESS OF BREATH: 0
DIARRHEA: 0
BACK PAIN: 0

## 2024-01-23 NOTE — PROGRESS NOTES
tourniquet was placed at the base to the toe. The area was then prepped and draped in an aseptic manner. A hemostat was then utilized to elevate the skin folds away from the underlying nail plate border. The hemostat was then utilized to elevate the nail plate away from the underlying nailbed. A hemostat was utilized to remove this nail plate from the field. A curette was utilized to ensure that all nail remnants were removed. The nail matrix was then treated with 3 applications of 89% phenol for 15 seconds each. The area was then irrigated with copious amounts of sterile saline solution. The wound was dressed with antibiotic oinment and a dry sterile dressing. The patient was given post-operative soaking and dressing instructions. The patient was encouraged to call or come in if any concerns arise.  3. Contact office with any questions/problems/concerns.  Return in about 2 weeks (around 2/6/2024) for First postop matricectomy right hallux.   1/23/2024      Ishan Arias DPM

## 2024-02-12 ENCOUNTER — OFFICE VISIT (OUTPATIENT)
Dept: PODIATRY | Age: 49
End: 2024-02-12
Payer: COMMERCIAL

## 2024-02-12 VITALS — WEIGHT: 283 LBS | HEIGHT: 74 IN | BODY MASS INDEX: 36.32 KG/M2

## 2024-02-12 DIAGNOSIS — L03.031 PARONYCHIA OF GREAT TOE OF RIGHT FOOT: Primary | ICD-10-CM

## 2024-02-12 DIAGNOSIS — M79.674 PAIN IN TOE OF RIGHT FOOT: ICD-10-CM

## 2024-02-12 DIAGNOSIS — L60.0 ONYCHOCRYPTOSIS: ICD-10-CM

## 2024-02-12 PROCEDURE — 99213 OFFICE O/P EST LOW 20 MIN: CPT | Performed by: PODIATRIST

## 2024-07-26 DIAGNOSIS — Z13.29 SCREENING FOR ENDOCRINE DISORDER: Primary | ICD-10-CM

## 2024-07-26 RX ORDER — SODIUM CHLORIDE 9 MG/ML
5-250 INJECTION, SOLUTION INTRAVENOUS PRN
OUTPATIENT
Start: 2024-07-26

## 2024-07-27 ENCOUNTER — HOSPITAL ENCOUNTER (OUTPATIENT)
Age: 49
Discharge: HOME | End: 2024-07-27
Payer: COMMERCIAL

## 2024-07-27 LAB
25(OH)D3 SERPL-MCNC: 36.7 NG/ML (ref 30–100)
ALBUMIN SERPL-MCNC: 4.5 G/DL (ref 3.5–5.2)
ALBUMIN/GLOB SERPL: 2 {RATIO} (ref 1–2.5)
ALP SERPL-CCNC: 70 U/L (ref 40–129)
ALT SERPL-CCNC: 27 U/L (ref 10–50)
ANION GAP SERPL CALCULATED.3IONS-SCNC: 8 MMOL/L (ref 9–16)
AST SERPL-CCNC: 25 U/L (ref 10–50)
BILIRUB SERPL-MCNC: 0.5 MG/DL (ref 0–1.2)
BUN SERPL-MCNC: 12 MG/DL (ref 6–20)
CALCIUM SERPL-MCNC: 9.1 MG/DL (ref 8.6–10.4)
CHLORIDE SERPL-SCNC: 109 MMOL/L (ref 98–107)
CO2 SERPL-SCNC: 23 MMOL/L (ref 20–31)
CREAT SERPL-MCNC: 1.1 MG/DL (ref 0.7–1.2)
FSH SERPL-ACNC: 21.6 MIU/ML (ref 1.5–12.4)
GFR, ESTIMATED: 83 ML/MIN/1.73M2
GLUCOSE P FAST SERPL-MCNC: 103 MG/DL (ref 74–99)
HCT VFR BLD AUTO: 48.9 % (ref 40.7–50.3)
INSULIN COMMENT: NORMAL
INSULIN REFERENCE RANGE:: NORMAL
INSULIN: 17.4 MU/L
IRON SATN MFR SERPL: 45 % (ref 20–55)
IRON SERPL-MCNC: 125 UG/DL (ref 61–157)
LH SERPL-ACNC: 9 MIU/ML (ref 1.7–8.6)
POTASSIUM SERPL-SCNC: 4.2 MMOL/L (ref 3.7–5.3)
PROLACTIN SERPL-MCNC: 9.4 NG/ML (ref 4.04–15.2)
PROT SERPL-MCNC: 6.8 G/DL (ref 6.6–8.7)
PSA SERPL-MCNC: 0.4 NG/ML (ref 0–4)
SODIUM SERPL-SCNC: 140 MMOL/L (ref 136–145)
T3FREE SERPL-MCNC: 3.5 PG/ML (ref 2–4.4)
T4 FREE SERPL-MCNC: 1 NG/DL (ref 0.92–1.68)
TESTOST SERPL-MCNC: 360 NG/DL (ref 193–740)
TIBC SERPL-MCNC: 278 UG/DL (ref 250–450)
TSH SERPL DL<=0.05 MIU/L-ACNC: 1.62 UIU/ML (ref 0.27–4.2)
UNSATURATED IRON BINDING CAPACITY: 153 UG/DL (ref 112–347)

## 2024-07-27 PROCEDURE — 82306 VITAMIN D 25 HYDROXY: CPT

## 2024-07-27 PROCEDURE — 83001 ASSAY OF GONADOTROPIN (FSH): CPT

## 2024-07-27 PROCEDURE — 83550 IRON BINDING TEST: CPT

## 2024-07-27 PROCEDURE — G0103 PSA SCREENING: HCPCS

## 2024-07-27 PROCEDURE — 84481 FREE ASSAY (FT-3): CPT

## 2024-07-27 PROCEDURE — 84146 ASSAY OF PROLACTIN: CPT

## 2024-07-27 PROCEDURE — 86376 MICROSOMAL ANTIBODY EACH: CPT

## 2024-07-27 PROCEDURE — 83002 ASSAY OF GONADOTROPIN (LH): CPT

## 2024-07-27 PROCEDURE — 82951 GLUCOSE TOLERANCE TEST (GTT): CPT

## 2024-07-27 PROCEDURE — 83525 ASSAY OF INSULIN: CPT

## 2024-07-27 PROCEDURE — 36415 COLL VENOUS BLD VENIPUNCTURE: CPT

## 2024-07-27 PROCEDURE — 84443 ASSAY THYROID STIM HORMONE: CPT

## 2024-07-27 PROCEDURE — 82952 GTT-ADDED SAMPLES: CPT

## 2024-07-27 PROCEDURE — 83540 ASSAY OF IRON: CPT

## 2024-07-27 PROCEDURE — 84403 ASSAY OF TOTAL TESTOSTERONE: CPT

## 2024-07-27 PROCEDURE — 84439 ASSAY OF FREE THYROXINE: CPT

## 2024-07-27 PROCEDURE — 80053 COMPREHEN METABOLIC PANEL: CPT

## 2024-07-27 PROCEDURE — 85014 HEMATOCRIT: CPT

## 2024-07-28 LAB
AMOUNT GLUCOSE GIVEN: NORMAL G
GLUCOSE 2H P 75 G GLC PO SERPL-MCNC: 111 MG/DL
GLUCOSE TOLERANCE TEST 1 HOUR: 174 MG/DL
GLUCOSE TOLERANCE TEST 2 HOUR: 79 MG/DL
GLUCOSE TOLERANCE TEST 3 HOUR: 74 MG/DL
GLUCOSE, 4 HOUR: 78 MG/DL
GLUCOSE, 5 HR: 90 MG/DL

## 2024-07-30 ENCOUNTER — TELEPHONE (OUTPATIENT)
Dept: INFUSION THERAPY | Facility: MEDICAL CENTER | Age: 49
End: 2024-07-30

## 2024-07-30 LAB — THYROPEROXIDASE AB SERPL IA-ACNC: 5 IU/ML (ref 0–25)

## 2024-07-30 NOTE — TELEPHONE ENCOUNTER
New order 7/25/24  Dr. Bunch  Labs: serum cortisol 30 minutes post injection  Cortrosyn 1 mcg IV  Order noted; chart to front for processing.

## 2024-07-31 ENCOUNTER — CLINICAL DOCUMENTATION (OUTPATIENT)
Facility: HOSPITAL | Age: 49
End: 2024-07-31

## 2024-08-02 ENCOUNTER — APPOINTMENT (OUTPATIENT)
Dept: GENERAL RADIOLOGY | Age: 49
End: 2024-08-02
Payer: COMMERCIAL

## 2024-08-02 ENCOUNTER — HOSPITAL ENCOUNTER (EMERGENCY)
Age: 49
Discharge: HOME | End: 2024-08-02
Attending: EMERGENCY MEDICINE
Payer: COMMERCIAL

## 2024-08-02 ENCOUNTER — HOSPITAL ENCOUNTER (OUTPATIENT)
Dept: INFUSION THERAPY | Facility: MEDICAL CENTER | Age: 49
Discharge: HOME | End: 2024-08-02
Payer: COMMERCIAL

## 2024-08-02 ENCOUNTER — HOSPITAL ENCOUNTER (OUTPATIENT)
Age: 49
Discharge: HOME | End: 2024-08-02
Payer: COMMERCIAL

## 2024-08-02 ENCOUNTER — HOSPITAL ENCOUNTER (OUTPATIENT)
Facility: MEDICAL CENTER | Age: 49
Discharge: HOME | End: 2024-08-02
Payer: COMMERCIAL

## 2024-08-02 ENCOUNTER — APPOINTMENT (OUTPATIENT)
Age: 49
End: 2024-08-02
Attending: EMERGENCY MEDICINE
Payer: COMMERCIAL

## 2024-08-02 VITALS
OXYGEN SATURATION: 94 % | DIASTOLIC BLOOD PRESSURE: 79 MMHG | RESPIRATION RATE: 18 BRPM | HEART RATE: 61 BPM | TEMPERATURE: 97.5 F | SYSTOLIC BLOOD PRESSURE: 125 MMHG

## 2024-08-02 VITALS
HEART RATE: 51 BPM | SYSTOLIC BLOOD PRESSURE: 112 MMHG | DIASTOLIC BLOOD PRESSURE: 72 MMHG | BODY MASS INDEX: 37.86 KG/M2 | HEIGHT: 74 IN | WEIGHT: 295 LBS | RESPIRATION RATE: 20 BRPM | TEMPERATURE: 97.9 F | OXYGEN SATURATION: 92 %

## 2024-08-02 VITALS
RESPIRATION RATE: 16 BRPM | HEART RATE: 45 BPM | SYSTOLIC BLOOD PRESSURE: 123 MMHG | TEMPERATURE: 97.9 F | OXYGEN SATURATION: 97 % | DIASTOLIC BLOOD PRESSURE: 84 MMHG

## 2024-08-02 DIAGNOSIS — U07.1 COVID: ICD-10-CM

## 2024-08-02 DIAGNOSIS — R00.1 BRADYCARDIA: Primary | ICD-10-CM

## 2024-08-02 DIAGNOSIS — Z13.29 SCREENING FOR ENDOCRINE DISORDER: Primary | ICD-10-CM

## 2024-08-02 LAB
ALBUMIN SERPL-MCNC: 4.3 G/DL (ref 3.5–5.2)
ALP SERPL-CCNC: 69 U/L (ref 40–129)
ALT SERPL-CCNC: 30 U/L (ref 5–41)
ANION GAP SERPL CALCULATED.3IONS-SCNC: 9 MMOL/L (ref 9–17)
AST SERPL-CCNC: 22 U/L
BASOPHILS # BLD: 0.05 K/UL (ref 0–0.2)
BASOPHILS NFR BLD: 1 % (ref 0–2)
BILIRUB SERPL-MCNC: 0.7 MG/DL (ref 0.3–1.2)
BUN SERPL-MCNC: 16 MG/DL (ref 6–20)
BUN/CREAT SERPL: 15 (ref 9–20)
CALCIUM SERPL-MCNC: 8.9 MG/DL (ref 8.6–10.4)
CHLORIDE SERPL-SCNC: 108 MMOL/L (ref 98–107)
CO2 SERPL-SCNC: 23 MMOL/L (ref 20–31)
CORTIS SERPL-MCNC: 13.4 UG/DL (ref 2.5–19.5)
CORTISOL COLLECTION INFO: NORMAL
CREAT SERPL-MCNC: 1.1 MG/DL (ref 0.7–1.2)
ECHO AO ROOT DIAM: 3 CM
ECHO AO ROOT INDEX: 1.17 CM/M2
ECHO AV PEAK GRADIENT: 10 MMHG
ECHO AV PEAK VELOCITY: 1.6 M/S
ECHO BSA: 2.64 M2
ECHO LA AREA 4C: 27 CM2
ECHO LA DIAMETER INDEX: 1.6 CM/M2
ECHO LA DIAMETER: 4.1 CM
ECHO LA MAJOR AXIS: 6.7 CM
ECHO LA TO AORTIC ROOT RATIO: 1.37
ECHO LA VOL MOD A4C: 85 ML (ref 18–58)
ECHO LA VOLUME INDEX MOD A4C: 33 ML/M2 (ref 16–34)
ECHO LV E' LATERAL VELOCITY: 9 CM/S
ECHO LV E' SEPTAL VELOCITY: 9 CM/S
ECHO LV FRACTIONAL SHORTENING: 25 % (ref 28–44)
ECHO LV INTERNAL DIMENSION DIASTOLE INDEX: 2.03 CM/M2
ECHO LV INTERNAL DIMENSION DIASTOLIC: 5.2 CM (ref 4.2–5.9)
ECHO LV INTERNAL DIMENSION SYSTOLIC INDEX: 1.52 CM/M2
ECHO LV INTERNAL DIMENSION SYSTOLIC: 3.9 CM
ECHO LV IVSD: 1.1 CM (ref 0.6–1)
ECHO LV MASS 2D: 207.3 G (ref 88–224)
ECHO LV MASS INDEX 2D: 81 G/M2 (ref 49–115)
ECHO LV POSTERIOR WALL DIASTOLIC: 1 CM (ref 0.6–1)
ECHO LV RELATIVE WALL THICKNESS RATIO: 0.38
ECHO MV A VELOCITY: 0.94 M/S
ECHO MV E DECELERATION TIME (DT): 208 MS
ECHO MV E VELOCITY: 0.87 M/S
ECHO MV E/A RATIO: 0.93
ECHO MV E/E' LATERAL: 9.67
ECHO MV E/E' RATIO (AVERAGED): 9.67
ECHO MV E/E' SEPTAL: 9.67
EOSINOPHIL # BLD: 0.21 K/UL (ref 0–0.44)
EOSINOPHILS RELATIVE PERCENT: 3 % (ref 1–4)
ERYTHROCYTE [DISTWIDTH] IN BLOOD BY AUTOMATED COUNT: 14.1 % (ref 11.8–14.4)
GFR, ESTIMATED: 82 ML/MIN/1.73M2
GLUCOSE SERPL-MCNC: 99 MG/DL (ref 70–99)
HCT VFR BLD AUTO: 48.3 % (ref 40.7–50.3)
HGB BLD-MCNC: 15.8 G/DL (ref 13–17)
IMM GRANULOCYTES # BLD AUTO: 0.03 K/UL (ref 0–0.3)
IMM GRANULOCYTES NFR BLD: 0 %
LYMPHOCYTES NFR BLD: 2.63 K/UL (ref 1.1–3.7)
LYMPHOCYTES RELATIVE PERCENT: 38 % (ref 24–43)
MCH RBC QN AUTO: 28 PG (ref 25.2–33.5)
MCHC RBC AUTO-ENTMCNC: 32.7 G/DL (ref 28.4–34.8)
MCV RBC AUTO: 85.6 FL (ref 82.6–102.9)
MONOCYTES NFR BLD: 0.71 K/UL (ref 0.1–1.2)
MONOCYTES NFR BLD: 10 % (ref 3–12)
NEUTROPHILS NFR BLD: 48 % (ref 36–65)
NEUTS SEG NFR BLD: 3.27 K/UL (ref 1.5–8.1)
NRBC BLD-RTO: 0 PER 100 WBC
PLATELET # BLD AUTO: 321 K/UL (ref 138–453)
PMV BLD AUTO: 8.8 FL (ref 8.1–13.5)
POTASSIUM SERPL-SCNC: 4.4 MMOL/L (ref 3.7–5.3)
PROT SERPL-MCNC: 7.1 G/DL (ref 6.4–8.3)
RBC # BLD AUTO: 5.64 M/UL (ref 4.21–5.77)
SODIUM SERPL-SCNC: 140 MMOL/L (ref 135–144)
TROPONIN I SERPL HS-MCNC: 6 NG/L (ref 0–22)
TROPONIN I SERPL HS-MCNC: <6 NG/L (ref 0–22)
WBC OTHER # BLD: 6.9 K/UL (ref 3.5–11.3)

## 2024-08-02 PROCEDURE — 6360000002 HC RX W HCPCS: Performed by: INTERNAL MEDICINE

## 2024-08-02 PROCEDURE — 96374 THER/PROPH/DIAG INJ IV PUSH: CPT

## 2024-08-02 PROCEDURE — 2580000003 HC RX 258: Performed by: INTERNAL MEDICINE

## 2024-08-02 PROCEDURE — 99285 EMERGENCY DEPT VISIT HI MDM: CPT

## 2024-08-02 PROCEDURE — 93005 ELECTROCARDIOGRAM TRACING: CPT | Performed by: EMERGENCY MEDICINE

## 2024-08-02 PROCEDURE — 84484 ASSAY OF TROPONIN QUANT: CPT

## 2024-08-02 PROCEDURE — 71045 X-RAY EXAM CHEST 1 VIEW: CPT

## 2024-08-02 PROCEDURE — 2580000003 HC RX 258: Performed by: EMERGENCY MEDICINE

## 2024-08-02 PROCEDURE — 82533 TOTAL CORTISOL: CPT

## 2024-08-02 PROCEDURE — 85025 COMPLETE CBC W/AUTO DIFF WBC: CPT

## 2024-08-02 PROCEDURE — 93306 TTE W/DOPPLER COMPLETE: CPT

## 2024-08-02 PROCEDURE — 80053 COMPREHEN METABOLIC PANEL: CPT

## 2024-08-02 RX ORDER — SODIUM CHLORIDE 9 MG/ML
5-250 INJECTION, SOLUTION INTRAVENOUS PRN
OUTPATIENT
Start: 2024-08-03

## 2024-08-02 RX ORDER — SODIUM CHLORIDE 9 MG/ML
5-250 INJECTION, SOLUTION INTRAVENOUS PRN
Status: DISCONTINUED | OUTPATIENT
Start: 2024-08-02 | End: 2024-08-03 | Stop reason: HOSPADM

## 2024-08-02 RX ORDER — SODIUM CHLORIDE 9 MG/ML
5-250 INJECTION, SOLUTION INTRAVENOUS PRN
Status: CANCELLED | OUTPATIENT
Start: 2024-08-02

## 2024-08-02 RX ORDER — SODIUM CHLORIDE 9 MG/ML
5-250 INJECTION, SOLUTION INTRAVENOUS PRN
Status: DISCONTINUED | OUTPATIENT
Start: 2024-08-02 | End: 2024-08-02 | Stop reason: CLARIF

## 2024-08-02 RX ORDER — 0.9 % SODIUM CHLORIDE 0.9 %
1000 INTRAVENOUS SOLUTION INTRAVENOUS ONCE
Status: COMPLETED | OUTPATIENT
Start: 2024-08-02 | End: 2024-08-02

## 2024-08-02 RX ADMIN — SODIUM CHLORIDE 1000 ML: 9 INJECTION, SOLUTION INTRAVENOUS at 09:49

## 2024-08-02 RX ADMIN — COSYNTROPIN 1 MCG: 0.25 INJECTION, POWDER, LYOPHILIZED, FOR SOLUTION INTRAMUSCULAR; INTRAVENOUS at 14:14

## 2024-08-02 RX ADMIN — SODIUM CHLORIDE 150 ML/HR: 9 INJECTION, SOLUTION INTRAVENOUS at 14:14

## 2024-08-02 ASSESSMENT — PAIN - FUNCTIONAL ASSESSMENT: PAIN_FUNCTIONAL_ASSESSMENT: NONE - DENIES PAIN

## 2024-08-02 NOTE — ED PROVIDER NOTES
EMERGENCY DEPARTMENT ENCOUNTER    Pt Name: Darian Rowe  MRN: [id number]  Birthdate 1975  Date of evaluation: 8/2/24  CHIEF COMPLAINT       Chief Complaint   Patient presents with    Bradycardia     Denies chest pain or sob     HISTORY OF PRESENT ILLNESS   The history is provided by the patient and medical records.  The patient is a 49-year-old male who was sent from [facility] for low heart rate.  Patient was in the hospital this morning to have a corticotropin stimulation test.  Staff reported his heart rate was in the 40s and sent him to the emergency room for further evaluation.  Patient is asymptomatic.  Other than feeling fatigued for the past few months he has no new issues this morning.  No chest pain, shortness of breath or lower extremity edema.    REVIEW OF SYSTEMS     Review of Systems  All other systems reviewed and are negative.    PASTMEDICAL HISTORY     Past Medical History:   Diagnosis Date    COVID 12/10/2021     Past Problem List  Patient Active Problem List   Diagnosis Code    COVID U07.1    COVID-19 U07.1    Screening for endocrine disorder Z13.29     SURGICAL HISTORY       Past Surgical History:   Procedure Laterality Date    APPENDECTOMY      BACK SURGERY  2-2013    CHOLECYSTECTOMY      EXPLORATION OF UNDESCENDED TESTICLE      TONSILLECTOMY      VASECTOMY       CURRENT MEDICATIONS       Previous Medications    No medications on file     ALLERGIES     has No Known Allergies.  FAMILY HISTORY     has no family status information on file.      SOCIAL HISTORY       Social History     Tobacco Use    Smoking status: Never    Smokeless tobacco: Current     Types: Chew   Substance Use Topics    Alcohol use: Yes     Comment: 1/month    Drug use: No     PHYSICAL EXAM     INITIAL VITALS: /72   Pulse 51   Temp 97.9 °F (36.6 °C) (Oral)   Resp 20   Ht 1.88 m (6' 2\")   Wt 133.8 kg (295 lb)   SpO2 92%   BMI 37.88 kg/m²    Physical Exam  Constitutional:       Appearance: Normal

## 2024-08-02 NOTE — DISCHARGE INSTR - COC
Treatments After Discharge: N/A    Physician Certification: I certify the above information and transfer of Darian Rowe  is necessary for the continuing treatment of the diagnosis listed and that he requires Acute Rehab for less 30 days.     Update Admission H&P: No change in H&P    PHYSICIAN SIGNATURE:  Electronically signed by Lisandro George MD on 8/2/24 at 12:53 PM EDT

## 2024-08-02 NOTE — DISCHARGE INSTRUCTIONS
Return to to this emergency room immediately if your symptoms persist, worsen or if new ones form.    Make sure you follow-up with the [facility] within the next 1-2 business days.

## 2024-08-02 NOTE — PROGRESS NOTES
Patient arrives ambulatory for cortrosyn stimulation test.  Patient states he has been tired, has brain fog, and \"not feeling like himself\" recently. Denies other concerns or complaints.  Vitals as charted. Patient bradycardic - checked manually, via blood pressure machine, and pulse ox. Patient denies taking any medications including blood pressure/cardiac meds.   Writer called and spoke with Malissa at Dr. Dsouza office; Per MD, orders to not do the test today and to send patient to ER. Patient agreeable.  Writer called and spoke with ER, ok to send patient down to waiting room.  Writer walked patient down to ER registration with no issues. Instructed patient to call and reschedule test if ordering provider permits; he verbalizes understanding.

## 2024-08-02 NOTE — PROGRESS NOTES
Patient returns for Cortrosyn stimulation test  Arrives ambulatory with spouse from the ED for evaluation for bradycardia from earlier visit this morning. See note  Pt denies complaint/concern  VS as charted - stable  IV established in the ED - #20G to Lt AC with brisk blood return  NS infusing  Cortrosyn 1 mcg IVP admin via running IV  Lab notified to draw blood specific time - 30 minutes after med given  Lab draw completed   Pt stable for discharge;IV removed & pressure dressing applied  Pt discharged ambulatory with spouse

## 2024-08-03 LAB
EKG ATRIAL RATE: 45 BPM
EKG P AXIS: 31 DEGREES
EKG P-R INTERVAL: 178 MS
EKG Q-T INTERVAL: 456 MS
EKG QRS DURATION: 120 MS
EKG QTC CALCULATION (BAZETT): 394 MS
EKG R AXIS: 40 DEGREES
EKG T AXIS: 57 DEGREES
EKG VENTRICULAR RATE: 45 BPM

## 2024-08-06 LAB — CORTISOL SALIVARY: <0.025 UG/DL

## 2025-04-14 ENCOUNTER — HOSPITAL ENCOUNTER (EMERGENCY)
Facility: CLINIC | Age: 50
Discharge: HOME | End: 2025-04-14
Attending: STUDENT IN AN ORGANIZED HEALTH CARE EDUCATION/TRAINING PROGRAM
Payer: COMMERCIAL

## 2025-04-14 ENCOUNTER — APPOINTMENT (OUTPATIENT)
Dept: GENERAL RADIOLOGY | Facility: CLINIC | Age: 50
End: 2025-04-14
Attending: STUDENT IN AN ORGANIZED HEALTH CARE EDUCATION/TRAINING PROGRAM
Payer: COMMERCIAL

## 2025-04-14 VITALS
RESPIRATION RATE: 20 BRPM | TEMPERATURE: 98.4 F | OXYGEN SATURATION: 100 % | SYSTOLIC BLOOD PRESSURE: 152 MMHG | DIASTOLIC BLOOD PRESSURE: 90 MMHG | HEART RATE: 73 BPM

## 2025-04-14 DIAGNOSIS — M25.562 ACUTE PAIN OF LEFT KNEE: Primary | ICD-10-CM

## 2025-04-14 PROCEDURE — 6370000000 HC RX 637 (ALT 250 FOR IP): Performed by: STUDENT IN AN ORGANIZED HEALTH CARE EDUCATION/TRAINING PROGRAM

## 2025-04-14 PROCEDURE — 73562 X-RAY EXAM OF KNEE 3: CPT

## 2025-04-14 PROCEDURE — 99283 EMERGENCY DEPT VISIT LOW MDM: CPT

## 2025-04-14 RX ORDER — LIDOCAINE 4 G/G
1 PATCH TOPICAL ONCE
Status: DISCONTINUED | OUTPATIENT
Start: 2025-04-14 | End: 2025-04-14 | Stop reason: HOSPADM

## 2025-04-14 NOTE — ED PROVIDER NOTES
Decision Making  DDx: Musculoskeletal pain, fracture, ligamentous injury, meniscus injury, other    50-year-old male presents with left knee pain.  Ongoing for the past few days, occurred when he stood up from the floor, no specific injuries.  No previous injuries to this knee.  Not take anything at home for pain.  Patient appears in no acute distress initial evaluation, afebrile, stable vital signs.  Ambulatory.  Bilateral lower extremities neurovascularly intact.  Does have mild diffuse tenderness about left knee, no appreciable ligamentous laxity.  Will plan on x-ray imaging.  Patient declines anything oral for symptomatic relief, agreeable to a lidocaine patch.  Will monitor closely and reassess.  Anticipate outpatient orthopedic follow-up.    Amount and/or Complexity of Data Reviewed  Radiology: ordered and independent interpretation performed.    Risk  OTC drugs.        DIAGNOSTIC RESULTS     LABS:  Labs Reviewed - No data to display    All other labs were within normal range or not returned as of this dictation.    RADIOLOGY:  XR KNEE LEFT (3 VIEWS)   ED Interpretation   Degenerative changes, no acute fracture          EKG:  None      ED COURSE     ED Course as of 04/14/25 1200   Mon Apr 14, 2025   1147 Per my interpretation of x-ray imaging no acute fracture.  Patient ambulatory.  Offered [facility] orthopedic appointment for tomorrow however patient declined.  Will discharge at this time with close outpatient follow-up.  Given strict return precautions.  Patient expressed understanding, agreed with plan. [AB]      ED Course User Index  [AB] Shanti Brown DO       CONSULTS:  None    PROCEDURES:  Procedures    Critical Care:  None    FINAL IMPRESSION      1. Acute pain of left knee          DISPOSITION/PLAN     DISPOSITION Decision To Discharge 04/14/2025 11:49:18 AM   DISPOSITION CONDITION Stable           PATIENT REFERRED TO:  [facility] Emergency Department  [address] [facility]

## 2025-04-14 NOTE — DISCHARGE INSTRUCTIONS
We evaluated you for your knee pain.  We saw some degenerative changes on your x-ray, as we discussed you most likely have a ligamentous injury.  Use Motrin or Tylenol as needed, use lidocaine patches.    Follow-up closely with your primary doctor.    Return to the emergency department if you develop any worsening or concerning symptoms.